# Patient Record
Sex: MALE | Race: OTHER | Employment: UNEMPLOYED | URBAN - METROPOLITAN AREA
[De-identification: names, ages, dates, MRNs, and addresses within clinical notes are randomized per-mention and may not be internally consistent; named-entity substitution may affect disease eponyms.]

---

## 2024-07-25 ENCOUNTER — OFFICE VISIT (OUTPATIENT)
Dept: GASTROENTEROLOGY | Age: 64
End: 2024-07-25

## 2024-07-25 VITALS
SYSTOLIC BLOOD PRESSURE: 153 MMHG | DIASTOLIC BLOOD PRESSURE: 97 MMHG | RESPIRATION RATE: 18 BRPM | HEART RATE: 73 BPM | OXYGEN SATURATION: 97 % | WEIGHT: 159 LBS

## 2024-07-25 DIAGNOSIS — K51.919 ULCERATIVE COLITIS WITH COMPLICATION, UNSPECIFIED LOCATION (HCC): Primary | ICD-10-CM

## 2024-07-25 PROCEDURE — 99204 OFFICE O/P NEW MOD 45 MIN: CPT | Performed by: STUDENT IN AN ORGANIZED HEALTH CARE EDUCATION/TRAINING PROGRAM

## 2024-07-25 RX ORDER — SULFASALAZINE 500 MG/1
1000 TABLET ORAL 3 TIMES DAILY
COMMUNITY

## 2024-07-25 NOTE — PROGRESS NOTES
wheezes, rales, or rhonchi.    Cardiovascular:  Regular rate and rhythm.     Abdomen:  Soft, non tender to palpation. No distention. Normoactive bowel sounds present.    Extremities: No edema bilaterally. No erythema  Neurologic:  Alert and oriented x3.  No sensory deficits. No asterixis  Psychiatric: Appropriate mood and affect.  Musculoskeletal: Strength and tone are symmetrical and maintained.    Assessment and Plan:   # Ulcerative colitis:  Original diagnosis was 7 years ago, we will start routine colon cancer surveillance 8 years into his diagnosis which will be October 2025.  After that he will have colonoscopy every 1 to 2 years.  Currently we will assess his inflammation with ESR, CRP, fecal calprotectin and obtain this every 6 months with follow-up every 6 months.  I have educated him to call us back if he has flares with bloody diarrhea.  He will continue sulfasalazine 2 tablets 3 times daily at this time.      Deann Vega MD  [facility]

## 2024-07-31 DIAGNOSIS — K51.919 ULCERATIVE COLITIS WITH COMPLICATION, UNSPECIFIED LOCATION (HCC): ICD-10-CM

## 2024-07-31 LAB — ERYTHROCYTE [SEDIMENTATION RATE] IN BLOOD: 7 MM/HR

## 2024-08-02 LAB
CALPROTECTIN STL-MCNT: 1140 UG/G (ref 0–120)
CRP SERPL-MCNC: 2 MG/L (ref 0–10)

## 2024-08-05 ENCOUNTER — TELEPHONE (OUTPATIENT)
Dept: GASTROENTEROLOGY | Age: 64
End: 2024-08-05

## 2024-08-05 NOTE — TELEPHONE ENCOUNTER
Called and informed pt of lab results with assistance of  help line, advising that per Dr. Vega's recommendation:    \"the stool test is very high suggesting there is significant ongoing inflammation, we can ask him again about his symptoms and depending on his response, he might need another colonoscopy, if he wants to monitor only for now then he is due to repeat colonoscopy in 6 months regardless. \"       Pt stated that he understands but is not currently having any symptoms at this time. Pt states that he is watching what he eats and avoiding known triggers. Pt stated that he will proceed with colonoscopy in 6 months as scheduled per the recall.     ----- Message from Deann Vega MD sent at 8/2/2024 11:37 PM EDT -----  We can tell him that the stool test is very high suggesting there is significant ongoing inflammation, we can ask him again about his symptoms and depending on his response, he might need another colonoscopy, if he wants to monitor only for now then he is due to repeat colonoscopy in 6 months regardless.

## 2024-08-28 ENCOUNTER — TELEPHONE (OUTPATIENT)
Age: 64
End: 2024-08-28

## 2024-08-28 NOTE — TELEPHONE ENCOUNTER
Patient's dtr, Margo, calling for refill of medication.  Could not tell me exactly which medication was needed.  Please return call to [phone].

## 2024-08-29 DIAGNOSIS — K51.919 ULCERATIVE COLITIS WITH COMPLICATION, UNSPECIFIED LOCATION (HCC): Primary | ICD-10-CM

## 2024-08-29 RX ORDER — SULFASALAZINE 500 MG/1
1000 TABLET ORAL 3 TIMES DAILY
Qty: 180 TABLET | Refills: 3 | Status: SHIPPED | OUTPATIENT
Start: 2024-08-29 | End: 2024-12-27

## 2025-01-29 ENCOUNTER — TELEPHONE (OUTPATIENT)
Age: 65
End: 2025-01-29

## 2025-01-29 DIAGNOSIS — K51.919 ULCERATIVE COLITIS WITH COMPLICATION, UNSPECIFIED LOCATION (HCC): Primary | ICD-10-CM

## 2025-01-29 RX ORDER — SULFASALAZINE 500 MG/1
1000 TABLET ORAL 3 TIMES DAILY
Qty: 180 TABLET | Refills: 3 | Status: SHIPPED | OUTPATIENT
Start: 2025-01-29 | End: 2025-05-29

## 2025-01-29 NOTE — TELEPHONE ENCOUNTER
Patient's dtr, Margo,  calling and asking for refill of med that Dr Vega prescribed.  Asking to return call to her @  [phone]. Will need .

## 2025-01-29 NOTE — TELEPHONE ENCOUNTER
Returned call to pt's daughter and informed her that pt's medication was refilled at the pharmacy on file. Also advised that pt is due for repeat labs of ESR, CRP, and Calprotectin. Pt's daughter verbalized understanding.     Pt's daughter requested if pt may have a sooner colonoscopy than the suggested date of 10/2025 at their most recent office visit with Dr. Vega. Advised pt that we would route to Dr. Vega and if he agrees a surgery scheduler would contact them to schedule. Pt's daughter verbalized understanding.

## 2025-02-04 DIAGNOSIS — K51.919 ULCERATIVE COLITIS WITH COMPLICATION, UNSPECIFIED LOCATION (HCC): Primary | ICD-10-CM

## 2025-02-10 ENCOUNTER — TELEPHONE (OUTPATIENT)
Dept: GASTROENTEROLOGY | Age: 65
End: 2025-02-10

## 2025-02-10 NOTE — TELEPHONE ENCOUNTER
Called patient's daughter (Margo) via  to schedule colonoscopy procedure with Dr Silvia ordoñez requesting call back

## 2025-02-12 DIAGNOSIS — K51.919 ULCERATIVE COLITIS WITH COMPLICATION, UNSPECIFIED LOCATION (HCC): ICD-10-CM

## 2025-02-12 LAB
CRP SERPL-MCNC: <0.3 MG/DL (ref 0–0.4)
ERYTHROCYTE [SEDIMENTATION RATE] IN BLOOD: 5 MM/HR

## 2025-02-13 ENCOUNTER — TELEPHONE (OUTPATIENT)
Dept: GASTROENTEROLOGY | Age: 65
End: 2025-02-13

## 2025-02-13 DIAGNOSIS — K51.919 ULCERATIVE COLITIS WITH COMPLICATION, UNSPECIFIED LOCATION (HCC): Primary | ICD-10-CM

## 2025-02-13 NOTE — TELEPHONE ENCOUNTER
Called and informed pt that per Dr. Vega's lab recommendation:    \"[CRP,ESR] look good, need fecal calprotectin as well and needs follow up appoipment. Ok for quick telemedicine. \"    Advised pt of need to submit fecal calprotectin sample in addition to scheduling a virtual visit. Suggested pt return call to schedule follow virtual visit.    ----- Message from Dr. Deann Vega MD sent at 2/13/2025 12:53 AM EST -----  These look good, need fecal calprotectin as well and needs follow up appoipment. Ok for quick telemedicine.

## 2025-02-14 DIAGNOSIS — K51.919 ULCERATIVE COLITIS WITH COMPLICATION, UNSPECIFIED LOCATION (HCC): ICD-10-CM

## 2025-02-18 ENCOUNTER — TELEPHONE (OUTPATIENT)
Dept: GASTROENTEROLOGY | Age: 65
End: 2025-02-18

## 2025-02-18 LAB — CALPROTECTIN STL-MCNT: 314 UG/G (ref 0–120)

## 2025-02-18 NOTE — TELEPHONE ENCOUNTER
Called and with the assistance of language services left voicemail advising that per Dr. Vega's recommendation:    Fecal Calprotectin \"is still very high indicating he has ongoing inflammation, therefore he needs an office visit with me and likely procedure.\"     Advised pt to return call to schedule office visit as soon as possible.  ----- Message from Dr. Deann Vega MD sent at 2/18/2025  8:44 AM EST -----  This is still very high indicating he has ongoing inflammation, therefore he needs an office visit with me and likely procedure.

## 2025-05-15 ENCOUNTER — TELEPHONE (OUTPATIENT)
Dept: GASTROENTEROLOGY | Age: 65
End: 2025-05-15

## 2025-05-15 NOTE — TELEPHONE ENCOUNTER
Pt daughter called and asked for c/b with .  RTC to daughter.  Per chart, pt needs a FU visit with Dr. Vega.     Daughter states that the patient has had a colonoscopy in [facility] and the physician there put him on medications for \"infection\" in the intestines.  She stated it was very important that they are able to get medications from Gastroenterology when the patient returns to the [place].    Pt was made a FU visit with Dr. Vega in July.  The daughter was encouraged to call us when the patient returns to the [facility] to discuss medications and what the needs to continue, or have refilled, or discontinued, etc.      Daughter verbalized understanding via .

## 2025-06-19 ENCOUNTER — TELEPHONE (OUTPATIENT)
Dept: GASTROENTEROLOGY | Age: 65
End: 2025-06-19

## 2025-06-19 NOTE — TELEPHONE ENCOUNTER
Returned patients call with  ,had questions of possible earlier appointment. Stated he will bring his medications that he has form [place]  to his appointment.

## 2025-06-30 ENCOUNTER — TELEPHONE (OUTPATIENT)
Age: 65
End: 2025-06-30

## 2025-07-24 ENCOUNTER — OFFICE VISIT (OUTPATIENT)
Dept: GASTROENTEROLOGY | Age: 65
End: 2025-07-24
Payer: COMMERCIAL

## 2025-07-24 VITALS
SYSTOLIC BLOOD PRESSURE: 152 MMHG | DIASTOLIC BLOOD PRESSURE: 87 MMHG | HEART RATE: 70 BPM | WEIGHT: 155 LBS | BODY MASS INDEX: 27.46 KG/M2 | TEMPERATURE: 98 F | HEIGHT: 63 IN | RESPIRATION RATE: 15 BRPM | OXYGEN SATURATION: 98 %

## 2025-07-24 DIAGNOSIS — K51.919 ULCERATIVE COLITIS WITH COMPLICATION, UNSPECIFIED LOCATION (HCC): Primary | ICD-10-CM

## 2025-07-24 DIAGNOSIS — K51.90 ULCERATIVE COLITIS WITHOUT COMPLICATIONS, UNSPECIFIED LOCATION (HCC): Primary | ICD-10-CM

## 2025-07-24 PROCEDURE — 1123F ACP DISCUSS/DSCN MKR DOCD: CPT | Performed by: STUDENT IN AN ORGANIZED HEALTH CARE EDUCATION/TRAINING PROGRAM

## 2025-07-24 PROCEDURE — 99214 OFFICE O/P EST MOD 30 MIN: CPT | Performed by: STUDENT IN AN ORGANIZED HEALTH CARE EDUCATION/TRAINING PROGRAM

## 2025-07-24 RX ORDER — MESALAMINE 500 MG/1
500 CAPSULE, EXTENDED RELEASE ORAL 3 TIMES DAILY
COMMUNITY

## 2025-07-24 NOTE — PROGRESS NOTES
Jensen Anderson is 65 y.o. y/o male here for initial evaluation.     History of Present Illness  The patient presents for ulcerative colitis. He is accompanied by an .    A colonoscopy was performed in 04/2025, which revealed moderate endoscopic activity in the left colon. The recommendation was to reassess and modify his treatment plan. He reports having bowel movements once a day, with no presence of blood. He is currently on sulfasalazine for his ulcerative colitis. His medication regimen was recently adjusted, and he has been taking the new medication for the past 3 months. He mentions that he is running low on his medication and plans to return to [place] in 12/2025, so he needs sufficient refills to last until then.     Office visit Dr. Vega 7/25/2024  Jensen Anderson is 64 y.o. y/o male here for initial evaluation. Referred from [facility].     Referral  Diagnosis   K51.90 (ICD-10-CM) - Ulcerative colitis (HCC)      HPI note 3/19/2024 Caterina Alarcon PA ([facility])  Patient is here as a walk-in today for med refills. States just moved here from [place] and needs meds for UC. Was diagnosed in [facility] and told he will need these meds for life.   Sulfasalazine 500 mg,  Take 2 tablets 3 times a day by oral route for 90 days.        His diagnosis of ulcerative colitis was done in [place] about 7 years ago when he presented with diarrhea without any significant bleeding.  He has been also for Solazine 2 tablets 3 times daily since then.  He reports currently having occasional bleeding episodes that he describes as light color, and few of diarrhea episodes as well.  His last colonoscopy was 1-1/2 years ago in [facility], I do not have the reports of this but reports having no findings to suggest cancer or colitis.  Denies any weight loss at this time, no family history of IBD or colon cancer.    Assessment and Plan:   # Ulcerative colitis:  Original diagnosis was 7 years

## 2025-07-30 DIAGNOSIS — K51.90 ULCERATIVE COLITIS WITHOUT COMPLICATIONS, UNSPECIFIED LOCATION (HCC): ICD-10-CM

## 2025-08-01 LAB — CALPROTECTIN STL-MCNT: 110 UG/G (ref 0–120)

## 2025-08-06 ENCOUNTER — TELEPHONE (OUTPATIENT)
Dept: GASTROENTEROLOGY | Age: 65
End: 2025-08-06

## 2025-08-06 DIAGNOSIS — K51.90 ULCERATIVE COLITIS WITHOUT COMPLICATIONS, UNSPECIFIED LOCATION (HCC): ICD-10-CM
